# Patient Record
Sex: FEMALE | Race: WHITE | NOT HISPANIC OR LATINO
[De-identification: names, ages, dates, MRNs, and addresses within clinical notes are randomized per-mention and may not be internally consistent; named-entity substitution may affect disease eponyms.]

---

## 2021-09-15 ENCOUNTER — NON-APPOINTMENT (OUTPATIENT)
Age: 71
End: 2021-09-15

## 2021-09-17 PROBLEM — Z00.00 ENCOUNTER FOR PREVENTIVE HEALTH EXAMINATION: Status: ACTIVE | Noted: 2021-09-17

## 2021-10-26 ENCOUNTER — APPOINTMENT (OUTPATIENT)
Dept: OTOLARYNGOLOGY | Facility: CLINIC | Age: 71
End: 2021-10-26

## 2021-11-23 ENCOUNTER — APPOINTMENT (OUTPATIENT)
Dept: OTOLARYNGOLOGY | Facility: CLINIC | Age: 71
End: 2021-11-23
Payer: COMMERCIAL

## 2021-11-23 PROCEDURE — 92567 TYMPANOMETRY: CPT

## 2021-11-23 PROCEDURE — 99243 OFF/OP CNSLTJ NEW/EST LOW 30: CPT

## 2021-11-23 PROCEDURE — 92557 COMPREHENSIVE HEARING TEST: CPT

## 2021-11-23 NOTE — HISTORY OF PRESENT ILLNESS
[de-identified] : PATRICIA HEDRICK is a 71 year old female who comes in complaining of Hearing loss. She finds it difficult to hear television and voices from the next room. She denies otalgia otorrhea or vertigo.The patient had no other ear nose or throat complaints at this visit.

## 2021-11-23 NOTE — CONSULT LETTER
[FreeTextEntry2] : YULISSA SHAH\par  [FreeTextEntry1] : \par \par Dear  Dr. JAYDEN SHAH,\par \par I had the pleasure of seeing your patient today.  \par Please see my note below.\par \par \par Thank you very much for allowing me to participate in the care of your patient.\par \par Sincerely,\par \par \par Jayden Garza MD\par [facility]\par [facility]\par  [facility]\par \par

## 2021-11-23 NOTE — ASSESSMENT
[FreeTextEntry1] : It was my impression that she has the high-frequency hearing losses starting at the upper speech frequencies. She likely would benefit from hearing aids in the options were discussed. She will consider doing this later in the year

## 2021-11-23 NOTE — PHYSICAL EXAM
[FreeTextEntry1] : \par The patient was alert and oriented and in no distress.\par Voice was clear.\par \par Face:\par The patient had no facial asymmetry or mass.\par The skin was unremarkable.\par \par Eyes:\par The pupils were equal round and reactive to light and accommodation.\par There was no significant nystagmus or disconjugate gaze noted.\par \par Nose: \par The external nose had no significant deformity.  There was no facial tenderness.  On anterior rhinoscopy, the nasal mucosa was clear.  The anterior septum was midline.  There were no visualized polyps purulence  or masses.\par \par Oral cavity:\par The oral mucosa was normal.\par The oral and base of tongue were clear and without mass.\par The gingival and buccal mucosa were moist and without lesions.\par The palate moved well.\par There was no cleft to the palate.\par There appeared to be good salivary flow.  \par There was no pus, erythema or mass in the oral cavity.\par \par \par Ears:\par The external ears were normal without deformity.\par The ear canals were clear.\par The tympanic membranes were intact and normal.\par \par Neck: \par The neck was symmetrical.\par The parotid and submandibular glands were normal without masses.\par The trachea was midline and there was no unusual crepitus.\par The thyroid was smooth and nontender and no masses were palpated.\par There was no significant cervical adenopathy.\par \par \par Neuro:\par Neurologically, the patient was awake, alert, and oriented to person, place and time. There were no obvious focal neurologic abnormalities.  Cranial nerves II through XII were grossly intact.\par \par \par TMJ:\par The temporomandibular joints were nontender.\par There was no abnormal crepitus and no significant malocclusion\par \par  [de-identified] : Audiogram:\par \par Audiometry showed that both ears had normal hearing through the lower speech frequencies with high pitched symmetric sensorineural hearing loss as above that.  Reflexes were intact and the patient had type A tympanograms.  The audiogram was reviewed with the patient.

## 2022-11-16 ENCOUNTER — APPOINTMENT (OUTPATIENT)
Dept: OTOLARYNGOLOGY | Facility: CLINIC | Age: 72
End: 2022-11-16

## 2022-11-16 ENCOUNTER — APPOINTMENT (OUTPATIENT)
Dept: OTOLARYNGOLOGY | Facility: CLINIC | Age: 72
End: 2022-11-16
Payer: MEDICARE

## 2022-11-16 VITALS — BODY MASS INDEX: 19.51 KG/M2 | HEIGHT: 62 IN | WEIGHT: 106 LBS | TEMPERATURE: 96.4 F

## 2022-11-16 DIAGNOSIS — Z78.9 OTHER SPECIFIED HEALTH STATUS: ICD-10-CM

## 2022-11-16 DIAGNOSIS — Z82.49 FAMILY HISTORY OF ISCHEMIC HEART DISEASE AND OTHER DISEASES OF THE CIRCULATORY SYSTEM: ICD-10-CM

## 2022-11-16 DIAGNOSIS — Z86.39 PERSONAL HISTORY OF OTHER ENDOCRINE, NUTRITIONAL AND METABOLIC DISEASE: ICD-10-CM

## 2022-11-16 PROCEDURE — V5010 ASSESSMENT FOR HEARING AID: CPT

## 2022-11-16 PROCEDURE — 92557 COMPREHENSIVE HEARING TEST: CPT

## 2022-11-16 PROCEDURE — 99213 OFFICE O/P EST LOW 20 MIN: CPT

## 2022-11-16 PROCEDURE — 92567 TYMPANOMETRY: CPT

## 2022-11-16 NOTE — ASSESSMENT
[FreeTextEntry1] : The hearing test was reviewed with the patient.  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  I felt that the patient would benefit from hearing aids and this was  recommended

## 2022-11-16 NOTE — HISTORY OF PRESENT ILLNESS
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  She is having more trouble with the activities of daily life.  She had a mild to moderate sensorineural hearing loss last year.  She denies otalgia otorrhea or vertigo.  The patient had no other ear nose or throat complaints at this visit.

## 2022-11-16 NOTE — HISTORY OF PRESENT ILLNESS
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  She is having more trouble with the activities of daily life.  She had a mild to moderate sensorineural hearing loss last year.  She denies otalgia otorrhea or vertigo.  The patient had no other ear nose or throat complaints at this visit.

## 2022-11-16 NOTE — CONSULT LETTER
[FreeTextEntry2] : YULISSA SHAH\par  [FreeTextEntry1] : \par \par Dear  Dr. JAYDEN SHAH,\par \par I had the pleasure of seeing your patient today.  \par Please see my note below.\par \par \par Thank you very much for allowing me to participate in the care of your patient.\par \par Sincerely,\par \par \par Jayden Garza MD\par [facility]\par [facility]\par  [facility]\par \par

## 2022-11-16 NOTE — CONSULT LETTER
[FreeTextEntry2] : YULISSA SHAH\par  [FreeTextEntry1] : \par \par Dear  Dr. JAYDEN SHAH,\par \par I had the pleasure of seeing your patient today.  \par Please see my note below.\par \par \par Thank you very much for allowing me to participate in the care of your patient.\par \par Sincerely,\par \par \par Jayden Garza MD\par [facility]\par [facility]\par  [facility]\par \par

## 2022-11-16 NOTE — PHYSICAL EXAM
[FreeTextEntry1] : \par The patient was alert and oriented and in no distress.\par Voice was clear.\par \par Face:\par The patient had no facial asymmetry or mass.\par The skin was unremarkable.\par \par Eyes:\par The pupils were equal round and reactive to light and accommodation.\par There was no significant nystagmus or disconjugate gaze noted.\par \par Nose: \par The external nose had no significant deformity.  There was no facial tenderness.  On anterior rhinoscopy, the nasal mucosa was clear.  The anterior septum was midline.  There were no visualized polyps purulence  or masses.\par \par Oral cavity:\par The oral mucosa was normal.\par The oral and base of tongue were clear and without mass.\par The gingival and buccal mucosa were moist and without lesions.\par The palate moved well.\par There was no cleft to the palate.\par There appeared to be good salivary flow.  \par There was no pus, erythema or mass in the oral cavity.\par \par \par Ears:\par The external ears were normal without deformity.\par The ear canals were clear.\par The tympanic membranes were intact and normal.\par \par Neck: \par The neck was symmetrical.\par The parotid and submandibular glands were normal without masses.\par The trachea was midline and there was no unusual crepitus.\par The thyroid was smooth and nontender and no masses were palpated.\par There was no significant cervical adenopathy.\par \par \par Neuro:\par Neurologically, the patient was awake, alert, and oriented to person, place and time. There were no obvious focal neurologic abnormalities.  Cranial nerves II through XII were grossly intact.\par \par \par TMJ:\par The temporomandibular joints were nontender.\par There was no abnormal crepitus and no significant malocclusion\par

## 2022-11-30 ENCOUNTER — APPOINTMENT (OUTPATIENT)
Dept: OTOLARYNGOLOGY | Facility: CLINIC | Age: 72
End: 2022-11-30

## 2022-11-30 PROCEDURE — V5261I: CUSTOM

## 2022-12-06 ENCOUNTER — APPOINTMENT (OUTPATIENT)
Dept: OTOLARYNGOLOGY | Facility: CLINIC | Age: 72
End: 2022-12-06

## 2022-12-06 PROCEDURE — 92593: CPT | Mod: NC

## 2022-12-21 ENCOUNTER — APPOINTMENT (OUTPATIENT)
Dept: OTOLARYNGOLOGY | Facility: CLINIC | Age: 72
End: 2022-12-21

## 2022-12-21 PROCEDURE — 92593: CPT | Mod: NC

## 2023-01-17 ENCOUNTER — APPOINTMENT (OUTPATIENT)
Dept: OTOLARYNGOLOGY | Facility: CLINIC | Age: 73
End: 2023-01-17
Payer: SELF-PAY

## 2023-01-17 PROCEDURE — 92593: CPT | Mod: NC

## 2023-03-21 ENCOUNTER — APPOINTMENT (OUTPATIENT)
Dept: OTOLARYNGOLOGY | Facility: CLINIC | Age: 73
End: 2023-03-21
Payer: SELF-PAY

## 2023-03-21 PROCEDURE — 92593: CPT | Mod: NC

## 2023-05-30 ENCOUNTER — APPOINTMENT (OUTPATIENT)
Dept: OTOLARYNGOLOGY | Facility: CLINIC | Age: 73
End: 2023-05-30
Payer: SELF-PAY

## 2023-05-30 PROCEDURE — 92593: CPT | Mod: NC

## 2023-09-26 ENCOUNTER — APPOINTMENT (OUTPATIENT)
Dept: OTOLARYNGOLOGY | Facility: CLINIC | Age: 73
End: 2023-09-26
Payer: SELF-PAY

## 2023-09-26 PROCEDURE — 92593: CPT | Mod: NC

## 2023-12-05 ENCOUNTER — APPOINTMENT (OUTPATIENT)
Dept: OTOLARYNGOLOGY | Facility: CLINIC | Age: 73
End: 2023-12-05
Payer: MEDICARE

## 2023-12-05 DIAGNOSIS — H90.3 SENSORINEURAL HEARING LOSS, BILATERAL: ICD-10-CM

## 2023-12-05 PROCEDURE — G0268 REMOVAL OF IMPACTED WAX MD: CPT

## 2023-12-05 PROCEDURE — 99212 OFFICE O/P EST SF 10 MIN: CPT | Mod: 25

## 2023-12-05 PROCEDURE — 92567 TYMPANOMETRY: CPT

## 2023-12-05 PROCEDURE — 92557 COMPREHENSIVE HEARING TEST: CPT

## 2023-12-14 ENCOUNTER — APPOINTMENT (OUTPATIENT)
Dept: OTOLARYNGOLOGY | Facility: CLINIC | Age: 73
End: 2023-12-14

## 2024-02-27 ENCOUNTER — APPOINTMENT (OUTPATIENT)
Dept: OTOLARYNGOLOGY | Facility: CLINIC | Age: 74
End: 2024-02-27
Payer: SELF-PAY

## 2024-02-27 PROCEDURE — 92593: CPT | Mod: NC

## 2024-06-05 ENCOUNTER — APPOINTMENT (OUTPATIENT)
Dept: OTOLARYNGOLOGY | Facility: CLINIC | Age: 74
End: 2024-06-05
Payer: SELF-PAY

## 2024-06-05 PROCEDURE — 92593: CPT | Mod: NC

## 2024-12-10 ENCOUNTER — APPOINTMENT (OUTPATIENT)
Dept: OTOLARYNGOLOGY | Facility: CLINIC | Age: 74
End: 2024-12-10
Payer: SELF-PAY

## 2024-12-10 ENCOUNTER — APPOINTMENT (OUTPATIENT)
Dept: OTOLARYNGOLOGY | Facility: CLINIC | Age: 74
End: 2024-12-10

## 2024-12-10 ENCOUNTER — APPOINTMENT (OUTPATIENT)
Dept: OTOLARYNGOLOGY | Facility: CLINIC | Age: 74
End: 2024-12-10
Payer: MEDICARE

## 2024-12-10 VITALS — BODY MASS INDEX: 20.01 KG/M2 | HEIGHT: 61 IN | WEIGHT: 106 LBS

## 2024-12-10 DIAGNOSIS — H90.3 SENSORINEURAL HEARING LOSS, BILATERAL: ICD-10-CM

## 2024-12-10 DIAGNOSIS — H61.20 IMPACTED CERUMEN, UNSPECIFIED EAR: ICD-10-CM

## 2024-12-10 PROCEDURE — G0268 REMOVAL OF IMPACTED WAX MD: CPT

## 2024-12-10 PROCEDURE — 92567 TYMPANOMETRY: CPT

## 2024-12-10 PROCEDURE — 92557 COMPREHENSIVE HEARING TEST: CPT

## 2024-12-10 PROCEDURE — 92593: CPT | Mod: NC

## 2024-12-10 PROCEDURE — 99213 OFFICE O/P EST LOW 20 MIN: CPT | Mod: 25

## 2024-12-10 RX ORDER — ROSUVASTATIN CALCIUM 5 MG/1
TABLET, FILM COATED ORAL
Refills: 0 | Status: ACTIVE | COMMUNITY

## 2024-12-10 RX ORDER — TERIPARATIDE 250 UG/ML
INJECTION, SOLUTION SUBCUTANEOUS
Refills: 0 | Status: ACTIVE | COMMUNITY

## 2025-02-05 ENCOUNTER — APPOINTMENT (OUTPATIENT)
Dept: OTOLARYNGOLOGY | Facility: CLINIC | Age: 75
End: 2025-02-05
Payer: SELF-PAY

## 2025-02-05 PROCEDURE — 92593: CPT | Mod: NC

## 2025-02-10 ENCOUNTER — NON-APPOINTMENT (OUTPATIENT)
Age: 75
End: 2025-02-10

## 2025-03-11 ENCOUNTER — APPOINTMENT (OUTPATIENT)
Dept: OTOLARYNGOLOGY | Facility: CLINIC | Age: 75
End: 2025-03-11
Payer: SELF-PAY

## 2025-03-11 PROCEDURE — 92593: CPT | Mod: NC

## 2025-09-09 ENCOUNTER — APPOINTMENT (OUTPATIENT)
Dept: OTOLARYNGOLOGY | Facility: CLINIC | Age: 75
End: 2025-09-09
Payer: SELF-PAY

## 2025-09-09 PROCEDURE — 92593: CPT | Mod: NC
